# Patient Record
Sex: FEMALE | Race: AMERICAN INDIAN OR ALASKA NATIVE
[De-identification: names, ages, dates, MRNs, and addresses within clinical notes are randomized per-mention and may not be internally consistent; named-entity substitution may affect disease eponyms.]

---

## 2017-07-18 ENCOUNTER — HOSPITAL ENCOUNTER (OUTPATIENT)
Dept: HOSPITAL 5 - OR | Age: 72
Discharge: HOME | End: 2017-07-18
Attending: SURGERY
Payer: MEDICARE

## 2017-07-18 VITALS — DIASTOLIC BLOOD PRESSURE: 83 MMHG | SYSTOLIC BLOOD PRESSURE: 151 MMHG

## 2017-07-18 DIAGNOSIS — E03.9: ICD-10-CM

## 2017-07-18 DIAGNOSIS — K21.9: ICD-10-CM
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
## 2017-07-18 DIAGNOSIS — Z86.73: ICD-10-CM

## 2017-07-18 DIAGNOSIS — I10: ICD-10-CM

## 2017-07-18 DIAGNOSIS — Z88.8: ICD-10-CM

## 2017-07-18 DIAGNOSIS — E66.9: ICD-10-CM

## 2017-07-18 DIAGNOSIS — Z79.4: ICD-10-CM

## 2017-07-18 DIAGNOSIS — Z88.0: ICD-10-CM

## 2017-07-18 DIAGNOSIS — Z98.890: ICD-10-CM

## 2017-07-18 DIAGNOSIS — Z91.09: ICD-10-CM

## 2017-07-18 DIAGNOSIS — Z80.3: ICD-10-CM

## 2017-07-18 DIAGNOSIS — E10.9: ICD-10-CM

## 2017-07-18 DIAGNOSIS — Z79.899: ICD-10-CM

## 2017-07-18 DIAGNOSIS — T82.598A: Primary | ICD-10-CM

## 2017-07-18 DIAGNOSIS — Z85.43: ICD-10-CM

## 2017-07-18 DIAGNOSIS — Z90.710: ICD-10-CM

## 2017-07-18 PROCEDURE — 82962 GLUCOSE BLOOD TEST: CPT

## 2017-07-18 PROCEDURE — 36590 REMOVAL TUNNELED CV CATH: CPT

## 2017-07-18 NOTE — OPERATIVE REPORT
Operative Report


Operative Report: 





Date of procedure 07/18/2017


Preoperative diagnosis mall functioning [id number]


Postoperative diagnosis - same ---


Operative procedure-removal of malfunctioning [id number]


Anesthesia local Mac


Surgeon Danilo Mccarthy


Specimens none 


Indications -71-year-old female with history of ovarian cancer had an Infuse-a-

Port inserted through the right internal jugular vein more than 20 half years 

ago.  She is in remission does not require the port anymore.  To prevent any 

further complications of leaving the port in place she is brought in for 

removal of the port


Procedure -after satisfactory IV sedation right infraclavicular and 

supraclavicular areas were prepped and draped.  After infiltrating local 

anesthesia consisting of Marcaine and lidocaine transverse incision was made 

over the scar of previous surgery and the skin tag was removed.  Subcutaneous 

tissue was divided.  The catheter was identified and was gently pulled out from 

the neck.  Pressure was applied over the incision site in the neck for 2-3 

minutes and no hematoma was noted.  The fiber sheath around the port was 

sharply divided and the port was removed intact.  The sheath was approximated 

with 20 Vicodin.  Subcutaneous tissue approximated with 30 Vicodin skin with 40 

Vicryls sutures.  She tolerated the procedure well----

## 2017-07-18 NOTE — ANESTHESIA DAY OF SURGERY
Anesthesia Day of Surgery





- Day of Surgery


Patient Examined: Yes


Patient H&P Reviewed: Yes


Patient is NPO: Yes


Beta Blockers: Yes (took atenolol this am)

## 2017-07-18 NOTE — POST OPERATIVE NOTE
Pre-op diagnosis: Malfunctioning inf port


Post-op diagnosis: same


Findings: 





as above


Procedure: 





Removal of infuse a port


Anesthesia: MAC


Surgeon: EMMA ROSARIO


Estimated blood loss: minimal


Pathology: none


Condition: stable


Disposition: PACU

## 2017-07-18 NOTE — ANESTHESIA CONSULTATION
Anesthesia Consult and Med Hx


Date of service: 07/18/17





- Airway


Anesthetic Teeth Evaluation: Edentulous


ROM Head & Neck: Adequate


Mental/Hyoid Distance: Adequate


Mallampati Class: Class II


Intubation Access Assessment: Probably Good





- Pulmonary Exam


CTA: Yes





- Cardiac Exam


Cardiac Exam: RRR





- Pre-Anesthesia Comment


Pre-Anesthesia Comments: only has a single tooth. Has 3/6 systolic murmur which 

she has had as long as she can remember.





- Pulmonary


Hx Smoking: No


Hx Asthma: No


SOB: No


COPD: No


Hx Sleep Apnea: No





- Cardiovascular System


Hx Hypertension: Yes


Hx Heart Attack/AMI: No


Hx Angina: No


Hx Percutaneous Transluminal Coronary Angioplasty (PTCA): No


Hx Heart Murmur: Yes





- Central Nervous System


CVA: Yes (Has  shunt)


Hx Psychiatric Problems: No





- Gastrointestinal


Hx Gastroesophageal Reflux Disease: Yes





- Endocrine


Hx Renal Disease: No


Hx Liver Disease: No


Hx Insulin Dependent Diabetes: Yes (lantus)


Hx Thyroid Disease: No


Hx Hypothyroidism: Yes





- Other Systems


Hx Cancer: Yes


Hx Obesity: Yes

## 2017-07-18 NOTE — DISCHARGE SUMMARY
Short Stay Discharge Plan


Weight Bearing Status: Full Weight Bearing


Diet: regular


Wound: open to air


Follow up with: 


KIMBERLEE NUÑEZ MD [Primary Care Provider] - 7 Days


Prescriptions: 


traMADol [Ultram 50 MG tab] 50 mg PO Q4HR PRN #20 tablet


 PRN Reason: Pain

## 2018-05-24 ENCOUNTER — HOSPITAL ENCOUNTER (EMERGENCY)
Dept: HOSPITAL 5 - ED | Age: 73
End: 2018-05-24
Payer: MEDICARE

## 2018-05-24 DIAGNOSIS — E11.9: ICD-10-CM

## 2018-05-24 DIAGNOSIS — Z88.0: ICD-10-CM

## 2018-05-24 DIAGNOSIS — I10: ICD-10-CM

## 2018-05-24 DIAGNOSIS — K21.9: ICD-10-CM

## 2018-05-24 DIAGNOSIS — Z88.8: ICD-10-CM

## 2018-05-24 DIAGNOSIS — I46.9: Primary | ICD-10-CM

## 2018-05-24 PROCEDURE — 99285 EMERGENCY DEPT VISIT HI MDM: CPT

## 2018-05-24 NOTE — EMERGENCY DEPARTMENT REPORT
ED General Adult HPI





- General


Chief complaint: Cardiac Arrest/CPR


Stated complaint: CARDIAC ARREST


Time Seen by Provider: 18 00:54


Source: family, EMS


Mode of arrival: Stretcher


Limitations: Other





- History of Present Illness


Initial comments: 





Patient is a 72-year-old female past medical history of diabetes who is 

presenting cardiac arrest.  Patient's family states that she was not feeling 

well all day.  Patient supposedly called her sister earlier in the afternoon 

stating she has some chest pain and was not feeling well.  When the patient's 

daughter and son arrived to the house patient was going back for between 100 

[facility] and not.  Patient took 15 units of her insulin and because she didn't 

feel much better assume that her sugar may have been elevated into 10 more.  

Patient family after the patient started lying down and got back up stating 

that she couldn't breathe and called 911.  Paramedics arrived on scene and 

stated that initially the patient was speaking however she then went into 

cardiopulmonary arrest.  CPR was initiated prior to arrival.  The patient was 

intubated with 7.5 endotracheal tube.  No IV access was able to be obtained on 

the route.  There is a 15 minute down time approximately before her arrival to 

our emergency department.





- Related Data


 Home Medications











 Medication  Instructions  Recorded  Confirmed  Last Taken


 


Atenolol 50 mg PO DAILY 14 06:30


 


Clonidine HCl [Catapres] 0.15 mg PO DAILY 14 06:30


 


Furosemide 40 mg PO BID 14


 


Losartan/Hydrochlorothiazide 1 tab PO BID 14





[Losartan-Hctz 50-12.5 mg Tab]    


 


Phenytoin Sodium Extended 300 mg PO QDAY 14


 


Potassium Chloride [K-Dur] 10 meq PO DAILY 14


 


Simvastatin 20 mg PO DAILY 14


 


Fluconazole [Diflucan ORAL SOLN] 100 mg PO QDAY 17


 


HYDROcodone/APAP  [Norco 1 each PO Q6HR PRN 0717





 mg TAB]    


 


Insulin Glargine [Lantus VIAL] 10 unit SUB-Q QHS 17


 


Insulin Lispro [Humalog 100 0 units SQ AC 17





UNITS/ML Kwikpen]    


 


Megestrol [Megace] 20 mg PO DAILY 17


 


Nystatin [Nystatin SUSP] 5 ml PO QID 17








 Previous Rx's











 Medication  Instructions  Recorded  Last Taken  Type


 


traMADol [Ultram 50 MG tab] 50 mg PO Q4HR PRN #20 tablet 17 Unknown Rx











 Allergies











Allergy/AdvReac Type Severity Reaction Status Date / Time


 


paclitaxel [From Taxol] Allergy  Anaphylaxis Verified 17 14:32


 


Penicillins Allergy  Unknown Verified 17 14:32


 


adhesive AdvReac  Rash Verified 14 09:33














ED Review of Systems


ROS: 


Stated complaint: CARDIAC ARREST


Other details as noted in HPI





Comment: Unobtainable due to pts medical conditions





ED Past Medical Hx





- Past Medical History


Previous Medical History?: Yes


Hx Hypertension: Yes


Hx Heart Attack/AMI: No


Hx Diabetes: Yes


Hx GERD: Yes (occasionally)


Hx Liver Disease: No


Hx Renal Disease: No


Hx Asthma: No


Hx COPD: No


Hx HIV: No





- Surgical History


Past Surgical History?: Yes





- Social History


Smoking Status: Never Smoker





- Medications


Home Medications: 


 Home Medications











 Medication  Instructions  Recorded  Confirmed  Last Taken  Type


 


Atenolol 50 mg PO DAILY 14 06:30 History


 


Clonidine HCl [Catapres] 0.15 mg PO DAILY 14 06:30 

History


 


Furosemide 40 mg PO BID 14 History


 


Losartan/Hydrochlorothiazide 1 tab PO BID 14 History





[Losartan-Hctz 50-12.5 mg Tab]     


 


Phenytoin Sodium Extended 300 mg PO QDAY 14 History


 


Potassium Chloride [K-Dur] 10 meq PO DAILY 14 History


 


Simvastatin 20 mg PO DAILY 14 History


 


Fluconazole [Diflucan ORAL SOLN] 100 mg PO QDAY 17 

History


 


HYDROcodone/APAP  [Norco 1 each PO Q6HR PRN 17 

History





 mg TAB]     


 


Insulin Glargine [Lantus VIAL] 10 unit SUB-Q QHS 17 

History


 


Insulin Lispro [Humalog 100 0 units SQ AC 17 History





UNITS/ML Kwikpen]     


 


Megestrol [Megace] 20 mg PO DAILY 17 History


 


Nystatin [Nystatin SUSP] 5 ml PO QID 17 History


 


traMADol [Ultram 50 MG tab] 50 mg PO Q4HR PRN #20 tablet 17  Unknown Rx














ED Physical Exam





- General


Limitations: Other


General appearance: obtunded





- Head


Head exam: Present: atraumatic, normocephalic





- Eye


Eye exam: Present: other (pupils are fixed and dilated)





- ENT


ENT exam: Present: mucous membranes moist





- Respiratory


Respiratory exam: Present: other (there are no spontaneous breath sounds.  

Coarse breath sounds bagging and pink frothy sputum in the endotracheal tube)





- Cardiovascular


Cardiovascular Exam: Present: other (no spontaneous heart sounds)





- GI/Abdominal


GI/Abdominal exam: Present: soft, distended





- Skin


Skin exam: Present: warm, dry, intact





ED Medical Decision Making





- Medical Decision Making





Please see nursing documentation regarding code sheet.  A interosseous line was 

placed by me in the right anterior shin secondary to poor IV access.  Several 

rounds of epinephrine and bicarbonate were given.  Patient's blood sugar just 

prior to arrival was in the 200 range.  Patient was pronounced here in 

emergency department.


Critical care attestation.: 


If time is entered above; I have spent that time in minutes in the direct care 

of this critically ill patient, excluding procedure time.








ED Disposition


Clinical Impression: 


 Cardiopulmonary arrest





Disposition: [id number] 


Is pt being admited?: No


Does the pt Need Aspirin: No


Condition: Stable


Referrals: 


PRIMARY CARE,MD [Primary Care Provider] - 3-5 Days